# Patient Record
Sex: MALE | Race: BLACK OR AFRICAN AMERICAN | NOT HISPANIC OR LATINO | Employment: FULL TIME | URBAN - METROPOLITAN AREA
[De-identification: names, ages, dates, MRNs, and addresses within clinical notes are randomized per-mention and may not be internally consistent; named-entity substitution may affect disease eponyms.]

---

## 2017-02-23 ENCOUNTER — HISTORICAL (OUTPATIENT)
Dept: ADMINISTRATIVE | Facility: HOSPITAL | Age: 37
End: 2017-02-23

## 2019-05-14 ENCOUNTER — HISTORICAL (OUTPATIENT)
Dept: ADMINISTRATIVE | Facility: HOSPITAL | Age: 39
End: 2019-05-14

## 2019-05-24 ENCOUNTER — HISTORICAL (OUTPATIENT)
Dept: RADIOLOGY | Facility: HOSPITAL | Age: 39
End: 2019-05-24

## 2019-06-11 LAB
ABS NEUT (OLG): 2.83 X10(3)/MCL (ref 2.1–9.2)
BASOPHILS # BLD AUTO: 0.02 X10(3)/MCL (ref 0–0.2)
BASOPHILS NFR BLD AUTO: 0.4 % (ref 0–0.9)
EOSINOPHIL # BLD AUTO: 0.05 X10(3)/MCL (ref 0–0.9)
EOSINOPHIL NFR BLD AUTO: 1 % (ref 0–6.5)
ERYTHROCYTE [DISTWIDTH] IN BLOOD BY AUTOMATED COUNT: 13 % (ref 11.5–17)
HCT VFR BLD AUTO: 44.6 % (ref 42–52)
HGB BLD-MCNC: 14.2 GM/DL (ref 14–18)
IMM GRANULOCYTES # BLD AUTO: 0.01 10*3/UL (ref 0–0.02)
IMM GRANULOCYTES NFR BLD AUTO: 0.2 % (ref 0–0.43)
LYMPHOCYTES # BLD AUTO: 1.53 X10(3)/MCL (ref 0.6–4.6)
LYMPHOCYTES NFR BLD AUTO: 31.2 % (ref 16.2–38.3)
MCH RBC QN AUTO: 28 PG (ref 27–31)
MCHC RBC AUTO-ENTMCNC: 31.8 GM/DL (ref 33–36)
MCV RBC AUTO: 88 FL (ref 80–94)
MONOCYTES # BLD AUTO: 0.46 X10(3)/MCL (ref 0.1–1.3)
MONOCYTES NFR BLD AUTO: 9.4 % (ref 4.7–11.3)
NEUTROPHILS # BLD AUTO: 2.83 X10(3)/MCL (ref 2.1–9.2)
NEUTROPHILS NFR BLD AUTO: 57.8 % (ref 49.1–73.4)
NRBC BLD AUTO-RTO: 0 % (ref 0–0.2)
PLATELET # BLD AUTO: 223 X10(3)/MCL (ref 130–400)
PMV BLD AUTO: 9.9 FL (ref 7.4–10.4)
RBC # BLD AUTO: 5.07 X10(6)/MCL (ref 4.7–6.1)
WBC # SPEC AUTO: 4.9 X10(3)/MCL (ref 4.5–11.5)

## 2019-06-19 ENCOUNTER — HISTORICAL (OUTPATIENT)
Dept: SURGERY | Facility: HOSPITAL | Age: 39
End: 2019-06-19

## 2019-11-04 ENCOUNTER — HISTORICAL (OUTPATIENT)
Dept: ADMINISTRATIVE | Facility: HOSPITAL | Age: 39
End: 2019-11-04

## 2019-11-04 LAB
ABS NEUT (OLG): 1.91 X10(3)/MCL (ref 2.1–9.2)
ALBUMIN SERPL-MCNC: 4 GM/DL (ref 3.4–5)
ALBUMIN/GLOB SERPL: 1.2 {RATIO}
ALP SERPL-CCNC: 81 UNIT/L (ref 50–136)
ALT SERPL-CCNC: 25 UNIT/L (ref 12–78)
APPEARANCE, UA: CLEAR
AST SERPL-CCNC: 16 UNIT/L (ref 15–37)
BACTERIA SPEC CULT: ABNORMAL /HPF
BASOPHILS # BLD AUTO: 0 X10(3)/MCL (ref 0–0.2)
BASOPHILS NFR BLD AUTO: 1 %
BILIRUB SERPL-MCNC: 0.9 MG/DL (ref 0.2–1)
BILIRUB UR QL STRIP: NEGATIVE
BILIRUBIN DIRECT+TOT PNL SERPL-MCNC: 0.1 MG/DL (ref 0–0.2)
BILIRUBIN DIRECT+TOT PNL SERPL-MCNC: 0.8 MG/DL (ref 0–0.8)
BUN SERPL-MCNC: 15 MG/DL (ref 7–18)
CALCIUM SERPL-MCNC: 9.2 MG/DL (ref 8.5–10.1)
CHLORIDE SERPL-SCNC: 104 MMOL/L (ref 98–107)
CHOLEST SERPL-MCNC: 201 MG/DL (ref 0–200)
CHOLEST/HDLC SERPL: 2.9 {RATIO} (ref 0–5)
CO2 SERPL-SCNC: 29 MMOL/L (ref 21–32)
COLOR UR: YELLOW
CREAT SERPL-MCNC: 1.12 MG/DL (ref 0.7–1.3)
DEPRECATED CALCIDIOL+CALCIFEROL SERPL-MC: 15.42 NG/ML (ref 30–80)
EOSINOPHIL # BLD AUTO: 0.1 X10(3)/MCL (ref 0–0.9)
EOSINOPHIL NFR BLD AUTO: 2 %
ERYTHROCYTE [DISTWIDTH] IN BLOOD BY AUTOMATED COUNT: 12.9 % (ref 11.5–17)
EST. AVERAGE GLUCOSE BLD GHB EST-MCNC: 114 MG/DL
GLOBULIN SER-MCNC: 3.4 GM/DL (ref 2.4–3.5)
GLUCOSE (UA): NEGATIVE
GLUCOSE SERPL-MCNC: 93 MG/DL (ref 74–106)
HBA1C MFR BLD: 5.6 % (ref 4.2–6.3)
HCT VFR BLD AUTO: 44.7 % (ref 42–52)
HDLC SERPL-MCNC: 69 MG/DL (ref 35–60)
HGB BLD-MCNC: 14.1 GM/DL (ref 14–18)
HGB UR QL STRIP: NEGATIVE
KETONES UR QL STRIP: ABNORMAL
LDLC SERPL CALC-MCNC: 105 MG/DL (ref 0–129)
LEUKOCYTE ESTERASE UR QL STRIP: NEGATIVE
LYMPHOCYTES # BLD AUTO: 1.8 X10(3)/MCL (ref 0.6–4.6)
LYMPHOCYTES NFR BLD AUTO: 44 %
MCH RBC QN AUTO: 27.9 PG (ref 27–31)
MCHC RBC AUTO-ENTMCNC: 31.5 GM/DL (ref 33–36)
MCV RBC AUTO: 88.3 FL (ref 80–94)
MONOCYTES # BLD AUTO: 0.3 X10(3)/MCL (ref 0.1–1.3)
MONOCYTES NFR BLD AUTO: 8 %
NEUTROPHILS # BLD AUTO: 1.91 X10(3)/MCL (ref 2.1–9.2)
NEUTROPHILS NFR BLD AUTO: 46 %
NITRITE UR QL STRIP: NEGATIVE
PH UR STRIP: 5 [PH] (ref 5–9)
PLATELET # BLD AUTO: 222 X10(3)/MCL (ref 130–400)
PMV BLD AUTO: 10.8 FL (ref 9.4–12.4)
POTASSIUM SERPL-SCNC: 3.9 MMOL/L (ref 3.5–5.1)
PROT SERPL-MCNC: 7.4 GM/DL (ref 6.4–8.2)
PROT UR QL STRIP: NEGATIVE
RBC # BLD AUTO: 5.06 X10(6)/MCL (ref 4.7–6.1)
RBC #/AREA URNS HPF: ABNORMAL /[HPF]
SODIUM SERPL-SCNC: 138 MMOL/L (ref 136–145)
SP GR UR STRIP: 1.03 (ref 1–1.03)
SQUAMOUS EPITHELIAL, UA: ABNORMAL
TRIGL SERPL-MCNC: 133 MG/DL (ref 30–150)
TSH SERPL-ACNC: 1.14 MIU/L (ref 0.36–3.74)
UROBILINOGEN UR STRIP-ACNC: 0.2
VLDLC SERPL CALC-MCNC: 27 MG/DL
WBC # SPEC AUTO: 4.2 X10(3)/MCL (ref 4.5–11.5)
WBC #/AREA URNS HPF: ABNORMAL /HPF

## 2021-01-06 ENCOUNTER — HISTORICAL (OUTPATIENT)
Dept: ADMINISTRATIVE | Facility: HOSPITAL | Age: 41
End: 2021-01-06

## 2021-01-06 LAB
ABS NEUT (OLG): 2.2 X10(3)/MCL (ref 2.1–9.2)
ALBUMIN SERPL-MCNC: 4 GM/DL (ref 3.5–5)
ALBUMIN/GLOB SERPL: 1.1 RATIO (ref 1.1–2)
ALP SERPL-CCNC: 74 UNIT/L (ref 40–150)
ALT SERPL-CCNC: 25 UNIT/L (ref 0–55)
APPEARANCE, UA: CLEAR
AST SERPL-CCNC: 19 UNIT/L (ref 5–34)
BACTERIA SPEC CULT: ABNORMAL /HPF
BASOPHILS # BLD AUTO: 0 X10(3)/MCL (ref 0–0.2)
BASOPHILS NFR BLD AUTO: 0 %
BILIRUB SERPL-MCNC: 0.4 MG/DL
BILIRUB UR QL STRIP: NEGATIVE
BILIRUBIN DIRECT+TOT PNL SERPL-MCNC: 0.2 MG/DL (ref 0–0.5)
BILIRUBIN DIRECT+TOT PNL SERPL-MCNC: 0.2 MG/DL (ref 0–0.8)
BUN SERPL-MCNC: 21.4 MG/DL (ref 8.9–20.6)
CALCIUM SERPL-MCNC: 8.6 MG/DL (ref 8.4–10.2)
CHLORIDE SERPL-SCNC: 106 MMOL/L (ref 98–107)
CHOLEST SERPL-MCNC: 187 MG/DL
CHOLEST/HDLC SERPL: 3 {RATIO} (ref 0–5)
CO2 SERPL-SCNC: 26 MMOL/L (ref 22–29)
COLOR UR: YELLOW
CREAT SERPL-MCNC: 1.06 MG/DL (ref 0.73–1.18)
EOSINOPHIL # BLD AUTO: 0.1 X10(3)/MCL (ref 0–0.9)
EOSINOPHIL NFR BLD AUTO: 2 %
ERYTHROCYTE [DISTWIDTH] IN BLOOD BY AUTOMATED COUNT: 13.1 % (ref 11.5–17)
GLOBULIN SER-MCNC: 3.5 GM/DL (ref 2.4–3.5)
GLUCOSE (UA): NEGATIVE
GLUCOSE SERPL-MCNC: 83 MG/DL (ref 74–100)
HCT VFR BLD AUTO: 44.8 % (ref 42–52)
HDLC SERPL-MCNC: 58 MG/DL (ref 35–60)
HGB BLD-MCNC: 14 GM/DL (ref 14–18)
HGB UR QL STRIP: ABNORMAL
KETONES UR QL STRIP: NEGATIVE
LDLC SERPL CALC-MCNC: 95 MG/DL (ref 50–140)
LEUKOCYTE ESTERASE UR QL STRIP: NEGATIVE
LYMPHOCYTES # BLD AUTO: 1.6 X10(3)/MCL (ref 0.6–4.6)
LYMPHOCYTES NFR BLD AUTO: 38 %
MCH RBC QN AUTO: 27.9 PG (ref 27–31)
MCHC RBC AUTO-ENTMCNC: 31.3 GM/DL (ref 33–36)
MCV RBC AUTO: 89.4 FL (ref 80–94)
MONOCYTES # BLD AUTO: 0.4 X10(3)/MCL (ref 0.1–1.3)
MONOCYTES NFR BLD AUTO: 9 %
NEUTROPHILS # BLD AUTO: 2.2 X10(3)/MCL (ref 2.1–9.2)
NEUTROPHILS NFR BLD AUTO: 51 %
NITRITE UR QL STRIP: NEGATIVE
PH UR STRIP: 5.5 [PH] (ref 5–9)
PLATELET # BLD AUTO: 216 X10(3)/MCL (ref 130–400)
PMV BLD AUTO: 11.5 FL (ref 9.4–12.4)
POTASSIUM SERPL-SCNC: 4.2 MMOL/L (ref 3.5–5.1)
PROT SERPL-MCNC: 7.5 GM/DL (ref 6.4–8.3)
PROT UR QL STRIP: NEGATIVE
RBC # BLD AUTO: 5.01 X10(6)/MCL (ref 4.7–6.1)
RBC #/AREA URNS HPF: 1 /HPF (ref 0–2)
SODIUM SERPL-SCNC: 141 MMOL/L (ref 136–145)
SP GR UR STRIP: >=1.03 (ref 1–1.03)
SQUAMOUS EPITHELIAL, UA: ABNORMAL
TRIGL SERPL-MCNC: 170 MG/DL (ref 34–140)
UROBILINOGEN UR STRIP-ACNC: 0.2
VLDLC SERPL CALC-MCNC: 34 MG/DL
WBC # SPEC AUTO: 4.3 X10(3)/MCL (ref 4.5–11.5)
WBC #/AREA URNS HPF: ABNORMAL /[HPF]

## 2022-04-11 ENCOUNTER — HISTORICAL (OUTPATIENT)
Dept: ADMINISTRATIVE | Facility: HOSPITAL | Age: 42
End: 2022-04-11

## 2022-04-20 ENCOUNTER — HISTORICAL (OUTPATIENT)
Dept: ADMINISTRATIVE | Facility: HOSPITAL | Age: 42
End: 2022-04-20

## 2022-04-20 LAB
ABS NEUT (OLG): 2.07 (ref 2.1–9.2)
ALBUMIN SERPL-MCNC: 3.7 G/DL (ref 3.5–5)
ALBUMIN/GLOB SERPL: 1 {RATIO} (ref 1.1–2)
ALP SERPL-CCNC: 78 U/L (ref 40–150)
ALT SERPL-CCNC: 24 U/L (ref 0–55)
APPEARANCE, UA: CLEAR
AST SERPL-CCNC: 22 U/L (ref 5–34)
BACTERIA SPEC CULT: NORMAL
BASOPHILS # BLD AUTO: 0 10*3/UL (ref 0–0.2)
BASOPHILS NFR BLD AUTO: 1 %
BILIRUB SERPL-MCNC: 0.4 MG/DL
BILIRUB UR QL STRIP: NEGATIVE
BILIRUBIN DIRECT+TOT PNL SERPL-MCNC: 0.2 (ref 0–0.5)
BILIRUBIN DIRECT+TOT PNL SERPL-MCNC: 0.2 (ref 0–0.8)
BUDDING YEAST: NORMAL
BUN SERPL-MCNC: 18.3 MG/DL (ref 8.9–20.6)
CALCIUM SERPL-MCNC: 9.4 MG/DL (ref 8.7–10.5)
CASTS, UA: NORMAL
CHLORIDE SERPL-SCNC: 103 MMOL/L (ref 98–107)
CHOLEST SERPL-MCNC: 192 MG/DL
CHOLEST/HDLC SERPL: 4 {RATIO} (ref 0–5)
CO2 SERPL-SCNC: 29 MMOL/L (ref 22–29)
COLOR UR: YELLOW
CREAT SERPL-MCNC: 1.28 MG/DL (ref 0.73–1.18)
CREAT UR-MCNC: 184.3 MG/DL (ref 58–161)
CRYSTALS: NORMAL
DEPRECATED CALCIDIOL+CALCIFEROL SERPL-MC: 15.3 NG/ML (ref 30–80)
EOSINOPHIL # BLD AUTO: 0.1 10*3/UL (ref 0–0.9)
EOSINOPHIL NFR BLD AUTO: 2 %
ERYTHROCYTE [DISTWIDTH] IN BLOOD BY AUTOMATED COUNT: 13.2 % (ref 11.5–17)
EST. AVERAGE GLUCOSE BLD GHB EST-MCNC: 114 MG/DL
GLOBULIN SER-MCNC: 3.6 G/DL (ref 2.4–3.5)
GLUCOSE (UA): NEGATIVE
GLUCOSE SERPL-MCNC: 74 MG/DL (ref 74–100)
HBA1C MFR BLD: 5.6 %
HCT VFR BLD AUTO: 43.3 % (ref 42–52)
HDLC SERPL-MCNC: 50 MG/DL (ref 35–60)
HEMOLYSIS INTERF INDEX SERPL-ACNC: 5
HGB BLD-MCNC: 13.8 G/DL (ref 14–18)
HGB UR QL STRIP: NEGATIVE
ICTERIC INTERF INDEX SERPL-ACNC: 1
KETONES UR QL STRIP: NEGATIVE
LDLC SERPL CALC-MCNC: 114 MG/DL (ref 50–140)
LEUKOCYTE ESTERASE UR QL STRIP: NEGATIVE
LIPEMIC INTERF INDEX SERPL-ACNC: 19
LYMPHOCYTES # BLD AUTO: 1.9 10*3/UL (ref 0.6–4.6)
LYMPHOCYTES NFR BLD AUTO: 42 %
MANUAL DIFF? (OHS): NO
MCH RBC QN AUTO: 27.6 PG (ref 27–31)
MCHC RBC AUTO-ENTMCNC: 31.9 G/DL (ref 33–36)
MCV RBC AUTO: 86.6 FL (ref 80–94)
MICROALBUMIN UR-MCNC: <5
MICROALBUMIN/CREAT RATIO PNL UR: <2.7 (ref 0–30)
MONOCYTES # BLD AUTO: 0.4 10*3/UL (ref 0.1–1.3)
MONOCYTES NFR BLD AUTO: 10 %
NEUTROPHILS # BLD AUTO: 2.07 10*3/UL (ref 2.1–9.2)
NEUTROPHILS NFR BLD AUTO: 45 %
NITRITE UR QL STRIP: NEGATIVE
PH UR STRIP: 6.5 [PH] (ref 5–9)
PLATELET # BLD AUTO: 234 10*3/UL (ref 130–400)
PMV BLD AUTO: 11.5 FL (ref 9.4–12.4)
POTASSIUM SERPL-SCNC: 4 MMOL/L (ref 3.5–5.1)
PROT SERPL-MCNC: 7.3 G/DL (ref 6.4–8.3)
PROT UR QL STRIP: NEGATIVE
RBC # BLD AUTO: 5 10*6/UL (ref 4.7–6.1)
RBC #/AREA URNS HPF: NORMAL /[HPF] (ref 0–2)
SMALL ROUND CELLS, UA: NORMAL
SODIUM SERPL-SCNC: 138 MMOL/L (ref 136–145)
SP GR UR STRIP: 1.02 (ref 1–1.03)
SPERM URNS QL MICRO: NORMAL
SQUAMOUS EPITHELIAL, UA: NORMAL (ref 0–4)
TRIGL SERPL-MCNC: 140 MG/DL (ref 34–140)
UROBILINOGEN UR STRIP-ACNC: 0.2
VLDLC SERPL CALC-MCNC: 28 MG/DL
WBC # SPEC AUTO: 4.6 10*3/UL (ref 4.5–11.5)
WBC #/AREA URNS HPF: NORMAL /[HPF] (ref 0–2)

## 2022-04-27 VITALS
HEIGHT: 74 IN | SYSTOLIC BLOOD PRESSURE: 110 MMHG | WEIGHT: 221 LBS | BODY MASS INDEX: 28.36 KG/M2 | DIASTOLIC BLOOD PRESSURE: 84 MMHG | OXYGEN SATURATION: 99 %

## 2022-04-29 NOTE — OP NOTE
DATE OF SURGERY:    06/19/2019    SURGEON:  Frederick Lara MD    PREOPERATIVE DIAGNOSIS:  Left knee lateral meniscus tear.    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    PROCEDURE:    1. Left knee scope, lateral meniscectomy, lateral side chondroplasty.  2. Patellofemoral chondroplasty.    INDICATIONS:  The patient is a 39-year-old male who is well known to me.  He has a history of left knee pain after sustaining an injury.  MRI was performed recently, noted to have a large lateral meniscus tear, as well as some chondromalacia to his lateral side.  I discussed all treatment options with the patient.  Risks, benefits and alternatives to left knee scoped were discussed in detail with the patient.  All questions were answered.  No guarantees were made.  Despite these risks, he elected to proceed with surgical intervention.    PROCEDURE IN DETAIL:  The patient was seen in preoperative holding area, was marked and consented for surgery.  Taken to the operative suite and placed under general endotracheal anesthesia.  Left leg prepped and draped in normal sterile fashion.  Time-out was performed verifying correct patient, site, side and procedure.  All were in agreement.  Left leg was exsanguinated with an Esmarch tourniquet.  Tourniquet was inflated to 300 mmHg.  Total tourniquet time was roughly 20 minutes.  A knife was used to make an incision on the lateral side of his patella for a left parapatellar portal.  The scope was inserted into the suprapatellar pouch.  Patella was evaluated.  We did find grade 2/some 3 chondromalacia to the patella apex.  No issue with the trochlea was noted.  I swung around to the medial gutter.  Medial gutter was evaluated, found to be clean of any loose bodies or any issues, popped into the medial joint line.  His medial meniscus was intact.  There was a slight ripple although it was very small and was stable.  There was no  chondromalacia.  No chondral damage noted to the medial side including the femur and the tibia.  Medial portal was localized with an 18-gauge spinal needle.  It was then incised with a knife and dilated with the trocar.  We then inserted our nerve hook, swung around to evaluate his ACL.  ACL was intact with no issues.  Upon entering the lateral compartment, we noted a very large posterolateral meniscus tear that was degenerative in nature, had some radial pieces, radial tears.  There were some free loose flaps as well.  This went from about the 2 o'clock position all the way around to the 5 o'clock position.  We also noted some grade 3 and some areas of early grade 4 chondromalacia mostly to his tibia.  No significant issues were noted to his femur.  We initially debrided the meniscus back to a stable border using a combination of a straight biter as well as a shaver.  Once we got the meniscus back to stable border, we debrided the majority of the meniscus, specifically in the posterolateral section.  We then performed a chondroplasty of the tibia, taking this back to a stable rim of cartilage.  After completion of his lateral meniscectomy, we then swung to the lateral gutter.  No issues noted here.  We went back to the suprapatellar pouch.  Using the shaver, we did perform a patellofemoral chondroplasty removing this fibrillation along his patella back to a stable rim.  Afterwards, the knee was drained of all excess fluid.  We dropped the tourniquet and coagulated all bleeders.  We then closed the knee with 3-0 nylon in interrupted fashion.  We then dressed the knee with sterile dressings.  The patient will be discharged home today with appropriate pain medication as well as an aspirin for DVT prophylaxis.  He will be seen back with me in 2 weeks.        ______________________________  MD ESMER Shaw/AROLDO  DD:  06/19/2019  Time:  08:17AM  DT:  06/19/2019  Time:  08:42AM  Job #:  [id number]

## 2022-05-31 RX ORDER — DEXTROAMPHETAMINE SACCHARATE, AMPHETAMINE ASPARTATE, DEXTROAMPHETAMINE SULFATE AND AMPHETAMINE SULFATE 7.5; 7.5; 7.5; 7.5 MG/1; MG/1; MG/1; MG/1
30 TABLET ORAL DAILY
Qty: 30 TABLET | Refills: 0 | Status: SHIPPED | OUTPATIENT
Start: 2022-05-31 | End: 2022-06-16 | Stop reason: SDUPTHER

## 2022-05-31 RX ORDER — DEXTROAMPHETAMINE SACCHARATE, AMPHETAMINE ASPARTATE, DEXTROAMPHETAMINE SULFATE AND AMPHETAMINE SULFATE 7.5; 7.5; 7.5; 7.5 MG/1; MG/1; MG/1; MG/1
30 TABLET ORAL
COMMUNITY
Start: 2021-12-15 | End: 2022-05-31 | Stop reason: SDUPTHER

## 2022-05-31 NOTE — TELEPHONE ENCOUNTER
Sent to Dr. lanier to approve  ----- Message from Yohana Gil sent at 5/31/2022  2:10 PM CDT -----  Need refills on Adderall  [facility] [address]

## 2022-06-16 RX ORDER — DEXTROAMPHETAMINE SACCHARATE, AMPHETAMINE ASPARTATE, DEXTROAMPHETAMINE SULFATE AND AMPHETAMINE SULFATE 7.5; 7.5; 7.5; 7.5 MG/1; MG/1; MG/1; MG/1
30 TABLET ORAL DAILY
Qty: 30 TABLET | Refills: 0 | Status: SHIPPED | OUTPATIENT
Start: 2022-06-16 | End: 2022-06-20 | Stop reason: SDUPTHER

## 2022-06-16 NOTE — TELEPHONE ENCOUNTER
----- Message from Delaney Banuelos sent at 6/16/2022  3:40 PM CDT -----  Regarding: med  Pt is req refill of Adderall - [facility] [address]  [phone]

## 2022-06-16 NOTE — TELEPHONE ENCOUNTER
Will send to Dr. Solano at this time  ----- Message from Delaney Banuelos sent at 6/16/2022  3:40 PM CDT -----  Regarding: med  Pt is req refill of Adderall - [facility] [address]  [phone]

## 2022-06-21 RX ORDER — DEXTROAMPHETAMINE SACCHARATE, AMPHETAMINE ASPARTATE, DEXTROAMPHETAMINE SULFATE AND AMPHETAMINE SULFATE 7.5; 7.5; 7.5; 7.5 MG/1; MG/1; MG/1; MG/1
30 TABLET ORAL 2 TIMES DAILY
Qty: 30 TABLET | Refills: 0 | Status: SHIPPED | OUTPATIENT
Start: 2022-06-21 | End: 2022-07-05 | Stop reason: SDUPTHER

## 2022-07-05 RX ORDER — DEXTROAMPHETAMINE SACCHARATE, AMPHETAMINE ASPARTATE, DEXTROAMPHETAMINE SULFATE AND AMPHETAMINE SULFATE 7.5; 7.5; 7.5; 7.5 MG/1; MG/1; MG/1; MG/1
30 TABLET ORAL 2 TIMES DAILY
Qty: 60 TABLET | Refills: 0 | Status: SHIPPED | OUTPATIENT
Start: 2022-07-05 | End: 2022-08-04 | Stop reason: SDUPTHER

## 2022-07-05 NOTE — TELEPHONE ENCOUNTER
This was sent on 6/21/22 but for only 15 days, will prashant  ----- Message from Yohana Gil sent at 7/5/2022  3:22 PM CDT -----  Need refills on adderal  [facility] [address]

## 2022-08-04 RX ORDER — DEXTROAMPHETAMINE SACCHARATE, AMPHETAMINE ASPARTATE, DEXTROAMPHETAMINE SULFATE AND AMPHETAMINE SULFATE 7.5; 7.5; 7.5; 7.5 MG/1; MG/1; MG/1; MG/1
30 TABLET ORAL 2 TIMES DAILY
Qty: 60 TABLET | Refills: 0 | Status: SHIPPED | OUTPATIENT
Start: 2022-08-04 | End: 2022-08-05 | Stop reason: SDUPTHER

## 2022-08-04 NOTE — TELEPHONE ENCOUNTER
----- Message from Delaney Banuelos sent at 8/4/2022  3:12 PM CDT -----  Regarding: med  Pt is req Refill of adderall 30 mg x2 daily  [facility] [address]  [phone]

## 2022-08-05 RX ORDER — DEXTROAMPHETAMINE SACCHARATE, AMPHETAMINE ASPARTATE, DEXTROAMPHETAMINE SULFATE AND AMPHETAMINE SULFATE 7.5; 7.5; 7.5; 7.5 MG/1; MG/1; MG/1; MG/1
30 TABLET ORAL 2 TIMES DAILY
Qty: 60 TABLET | Refills: 0 | Status: SHIPPED | OUTPATIENT
Start: 2022-08-05 | End: 2022-09-06 | Stop reason: SDUPTHER

## 2022-09-06 RX ORDER — DEXTROAMPHETAMINE SACCHARATE, AMPHETAMINE ASPARTATE, DEXTROAMPHETAMINE SULFATE AND AMPHETAMINE SULFATE 7.5; 7.5; 7.5; 7.5 MG/1; MG/1; MG/1; MG/1
30 TABLET ORAL 2 TIMES DAILY
Qty: 60 TABLET | Refills: 0 | Status: SHIPPED | OUTPATIENT
Start: 2022-09-06 | End: 2022-10-12 | Stop reason: SDUPTHER

## 2022-09-06 NOTE — TELEPHONE ENCOUNTER
Will send  ----- Message from Mirta Inman Patient Care Assistant sent at 9/6/2022  3:52 PM CDT -----  Regarding: med refill  Type:  RX Refill Request    RX Name and Strength: dextroamphetamine-amphetamine (ADDERALL) 30 mg Tab    How is the patient currently taking it? (ex. 1XDay): Take 1 tablet (30 mg total) by mouth 2 (two) times a day    Is this a 30 day or 90 day RX: 60 tablets    Preferred Pharmacy with phone number: [facility]/pharmacy #[id number]  [place] - [address] AT [address] [facility] Call Back Number: [phone]    Additional Information: pt req refill on meds please

## 2022-10-12 RX ORDER — DEXTROAMPHETAMINE SACCHARATE, AMPHETAMINE ASPARTATE, DEXTROAMPHETAMINE SULFATE AND AMPHETAMINE SULFATE 7.5; 7.5; 7.5; 7.5 MG/1; MG/1; MG/1; MG/1
30 TABLET ORAL 2 TIMES DAILY
Qty: 60 TABLET | Refills: 0 | Status: SHIPPED | OUTPATIENT
Start: 2022-10-12 | End: 2022-11-14 | Stop reason: SDUPTHER

## 2022-10-12 NOTE — TELEPHONE ENCOUNTER
----- Message from Delaney Banuelos sent at 10/12/2022  3:01 PM CDT -----  Regarding: med  Pt is req Refill adderall - [facility] [address]  [phone]

## 2022-11-14 DIAGNOSIS — F41.9 ANXIETY: Primary | ICD-10-CM

## 2022-11-14 RX ORDER — DEXTROAMPHETAMINE SACCHARATE, AMPHETAMINE ASPARTATE, DEXTROAMPHETAMINE SULFATE AND AMPHETAMINE SULFATE 7.5; 7.5; 7.5; 7.5 MG/1; MG/1; MG/1; MG/1
30 TABLET ORAL 2 TIMES DAILY
Qty: 60 TABLET | Refills: 0 | Status: SHIPPED | OUTPATIENT
Start: 2022-11-14 | End: 2022-11-15 | Stop reason: SDUPTHER

## 2022-11-14 NOTE — TELEPHONE ENCOUNTER
----- Message from Yohana Gil sent at 11/14/2022  3:10 PM CST -----  Needs refills on Adderall  [facility] [address]

## 2022-11-15 RX ORDER — DEXTROAMPHETAMINE SACCHARATE, AMPHETAMINE ASPARTATE, DEXTROAMPHETAMINE SULFATE AND AMPHETAMINE SULFATE 7.5; 7.5; 7.5; 7.5 MG/1; MG/1; MG/1; MG/1
30 TABLET ORAL 2 TIMES DAILY
Qty: 60 TABLET | Refills: 0 | Status: SHIPPED | OUTPATIENT
Start: 2022-11-15 | End: 2022-12-14 | Stop reason: SDUPTHER

## 2022-12-14 DIAGNOSIS — F41.9 ANXIETY: ICD-10-CM

## 2022-12-14 RX ORDER — DEXTROAMPHETAMINE SACCHARATE, AMPHETAMINE ASPARTATE, DEXTROAMPHETAMINE SULFATE AND AMPHETAMINE SULFATE 7.5; 7.5; 7.5; 7.5 MG/1; MG/1; MG/1; MG/1
30 TABLET ORAL 2 TIMES DAILY
Qty: 14 TABLET | Refills: 0 | Status: SHIPPED | OUTPATIENT
Start: 2022-12-14 | End: 2022-12-21 | Stop reason: SDUPTHER

## 2022-12-14 NOTE — TELEPHONE ENCOUNTER
----- Message from Mirta Inman Patient Care Assistant sent at 12/14/2022  3:51 PM CST -----  Regarding: med refill  Type:  RX Refill Request    RX Name and Strength: dextroamphetamine-amphetamine (ADDERALL) 30 mg Tab    How is the patient currently taking it? (ex. 1XDay): Take 1 tablet (30 mg total) by mouth 2 (two) times a day    Is this a 30 day or 90 day RX: 60 tablets    Preferred Pharmacy with phone number: [facility]/PHARMACY #[id number] - [place] - [address] Call Back Number: [phone]    Additional Information: pt needs refill on med please

## 2022-12-21 DIAGNOSIS — F41.9 ANXIETY: ICD-10-CM

## 2022-12-22 NOTE — TELEPHONE ENCOUNTER
----- Message from Yohana Gil sent at 12/22/2022 10:55 AM CST -----  Spoke to Evan made him aware, appt also set for 01/26/23, thanks  ----- Message -----  From: Molly Servin MA  Sent: 12/22/2022  10:41 AM CST  To: Yohana Gil    Ill have Dr. Hong refill for the rest we can put him for a month.     ----- Message -----  From: Yohana Gil  Sent: 12/22/2022  10:38 AM CST  To: Molly Servin MA    I called Evan, he only has 2 pills left of Adderall. Is it possible to make an appt for him tomorrow morning   ----- Message -----  From: Molly Servin MA  Sent: 12/21/2022   4:52 PM CST  To: Yohana Gil    Patient needs appt for a follow up to refill more  ----- Message -----  From: Yohana Gil  Sent: 12/21/2022   3:47 PM CST  To: Molly Servin MA    Calling for the remaining script for adderall  [facility] [address] [facility] [address]

## 2022-12-23 RX ORDER — DEXTROAMPHETAMINE SACCHARATE, AMPHETAMINE ASPARTATE, DEXTROAMPHETAMINE SULFATE AND AMPHETAMINE SULFATE 7.5; 7.5; 7.5; 7.5 MG/1; MG/1; MG/1; MG/1
30 TABLET ORAL 2 TIMES DAILY
Qty: 46 TABLET | Refills: 0 | Status: SHIPPED | OUTPATIENT
Start: 2022-12-23 | End: 2023-01-19 | Stop reason: SDUPTHER

## 2023-01-19 ENCOUNTER — OFFICE VISIT (OUTPATIENT)
Dept: INTERNAL MEDICINE | Facility: CLINIC | Age: 43
End: 2023-01-19
Payer: COMMERCIAL

## 2023-01-19 VITALS
WEIGHT: 222 LBS | SYSTOLIC BLOOD PRESSURE: 120 MMHG | OXYGEN SATURATION: 97 % | HEART RATE: 73 BPM | BODY MASS INDEX: 28.49 KG/M2 | DIASTOLIC BLOOD PRESSURE: 80 MMHG | HEIGHT: 74 IN

## 2023-01-19 DIAGNOSIS — F90.9 ATTENTION DEFICIT HYPERACTIVITY DISORDER (ADHD), UNSPECIFIED ADHD TYPE: Primary | ICD-10-CM

## 2023-01-19 PROBLEM — R25.1 TREMOR: Status: ACTIVE | Noted: 2023-01-19

## 2023-01-19 PROBLEM — F90.0 ATTENTION DEFICIT HYPERACTIVITY DISORDER (ADHD), PREDOMINANTLY INATTENTIVE TYPE: Status: ACTIVE | Noted: 2023-01-19

## 2023-01-19 PROCEDURE — 99214 PR OFFICE/OUTPT VISIT, EST, LEVL IV, 30-39 MIN: ICD-10-PCS | Mod: ,,, | Performed by: INTERNAL MEDICINE

## 2023-01-19 PROCEDURE — 99214 OFFICE O/P EST MOD 30 MIN: CPT | Mod: ,,, | Performed by: INTERNAL MEDICINE

## 2023-01-19 RX ORDER — DEXTROAMPHETAMINE SACCHARATE, AMPHETAMINE ASPARTATE, DEXTROAMPHETAMINE SULFATE AND AMPHETAMINE SULFATE 7.5; 7.5; 7.5; 7.5 MG/1; MG/1; MG/1; MG/1
30 TABLET ORAL 2 TIMES DAILY
Qty: 46 TABLET | Refills: 0 | Status: SHIPPED | OUTPATIENT
Start: 2023-01-19 | End: 2023-02-14 | Stop reason: SDUPTHER

## 2023-01-19 NOTE — PROGRESS NOTES
Subjective:      Patient ID: Rashid Severino Jr. is a 42 y.o. male.    Chief Complaint: Follow-up (ADHD f/u)      HPI=This patient is an established patient. The active problem list and current medication listed in the chart will be reviewed at the time of this visit. This patient's medical illnesses have been well recognized and documented in the chart. A review of systems will be taken at the time of this visit, and any new information necessary for care will be documented.  This patient is a 42-year-old established patient who has a long history of an attention deficit disorder.  He has been on medication for at least 10 years and he has done very well.  By the history that he gives the quality of his life improved as he begin to take the medication he had excellent readings at his work.  He was able to get a long in into reactions with other people.  He will improved peer into reactions he has been monitored in his blood pressure, and he has never had any blood pressure problems and he has never had any cardiac irregularity.  He has been able to coordinate his work schedule with his family life, and he has not had any trouble.      The requirement is that this patient comes to the office at least every 4 months, and since I will only be here in practice over the next 60 days, I have asked him to start looking into getting a new physician going forward who may continue to give him his medication as he currently takes it.       The patient's Health Maintenance was reviewed and the following appears to be due at this time:   Health Maintenance Due   Topic Date Due    Hepatitis C Screening  Never done    HIV Screening  Never done    TETANUS VACCINE  06/08/2009    COVID-19 Vaccine (3 - Booster for Pfizer series) 10/15/2021    Influenza Vaccine (1) Never done        Recent Labwork  No visits with results within 1 Month(s) from this visit.   Latest known visit with results is:   Historical on 04/20/2022    Component Date Value Ref Range Status    Hemoglobin A1c 04/20/2022 5.6  <=7.0 Final    Estimated Average Glucose 04/20/2022 114.0   Final    WBC 04/20/2022 4.6  4.5 - 11.5 Final    RBC 04/20/2022 5.00  4.70 - 6.10 Final    Hgb 04/20/2022 13.8  14.0 - 18.0 Final    Hct 04/20/2022 43.3  42.0 - 52.0 Final    MCV 04/20/2022 86.6  80.0 - 94.0 Final    MCH 04/20/2022 27.6  27.0 - 31.0 Final    MCHC 04/20/2022 31.9  33.0 - 36.0 Final    RDW 04/20/2022 13.2  11.5 - 17.0 Final    Platelet 04/20/2022 234  130 - 400 Final    MPV 04/20/2022 11.5  9.4 - 12.4 Final    Abs Neut 04/20/2022 2.07  2.10 - 9.20 Final    Manual Diff? 04/20/2022 No   Final    Neut % 04/20/2022 45   Final    Lymph % 04/20/2022 42   Final    Mono % 04/20/2022 10   Final    Eos % 04/20/2022 2   Final    Basophil % 04/20/2022 1   Final    Lymph # 04/20/2022 1.9  0.6 - 4.6 Final    Neut # 04/20/2022 2.07  2.10 - 9.20 Final    Mono # 04/20/2022 0.4  0.1 - 1.3 Final    Eos # 04/20/2022 0.1  0.0 - 0.9 Final    Baso # 04/20/2022 0.0  0.0 - 0.2 Final    Color, UA 04/20/2022 Yellow  Yellow Final    Appearance, UA 04/20/2022 Clear  Clear Final    Specific Gravity,UA 04/20/2022 1.025  1.000 - 1.032 Final    pH, UA 04/20/2022 6.5  5.0 - 9.0 Final    Protein, UA 04/20/2022 Negative  Negative Final    Glucose, UA 04/20/2022 Negative  Negative Final    Ketones, UA 04/20/2022 Negative  Negative Final    Bilirubin (UA) 04/20/2022 Negative  Negative Final    Occult Blood UA 04/20/2022 Negative  Negative Final    Urobilinogen, UA 04/20/2022 0.2   Final    Nitrite, UA 04/20/2022 Negative  Negative Final    Leukocytes, UA 04/20/2022 Negative  Negative Final    WBC, UA 04/20/2022 NONE SEEN  0 - 2 Final    RBC, UA 04/20/2022 NONE SEEN  0 - 2 Final    Squam Epithel, UA 04/20/2022 NONE SEEN  0 - 4 Final    Bacteria 04/20/2022 NONE SEEN  None Seen Final    Yeast, UA 04/20/2022 NOT PRESENT   Final    Sperm, UA 04/20/2022 NOT PRESENT    Final    Crystals 04/20/2022 NOT PRESENT   Final    Small Round Cells, UA 04/20/2022 NOT PRESENT   Final    CASTS, UA 04/20/2022 NONE SEEN   Final    Cholesterol Total 04/20/2022 192  <=200 Final    HDL Cholesterol 04/20/2022 50  35 - 60 Final    Triglyceride 04/20/2022 140  34 - 140 Final    Very Low Density Lipoprotein 04/20/2022 28   Final    Cholesterol/HDL Ratio 04/20/2022 4  0 - 5 Final    LDL Cholesterol 04/20/2022 114.00  50.00 - 140.00 Final    Urine Microalbumin 04/20/2022 <5.0  <=30.0 Final    Urine Creatinine 04/20/2022 184.3  58.0 - 161.0 Final    Microalbumin Creatinine Ratio 04/20/2022 <2.7  0.0 - 30.0 Final    Sodium Level 04/20/2022 138  136 - 145 Final    Potassium Level 04/20/2022 4.0  3.5 - 5.1 Final    Chloride 04/20/2022 103  98 - 107 Final    Carbon Dioxide 04/20/2022 29  22 - 29 Final    Glucose Level 04/20/2022 74  74 - 100 Final    Blood Urea Nitrogen 04/20/2022 18.3  8.9 - 20.6 Final    Creatinine 04/20/2022 1.28  0.73 - 1.18 Final    Calcium Level Total 04/20/2022 9.4  8.7 - 10.5 Final    Albumin Level 04/20/2022 3.7  3.5 - 5.0 Final    Protein Total 04/20/2022 7.3  6.4 - 8.3 Final    Globulin 04/20/2022 3.6  2.4 - 3.5 Final    Albumin/Globulin Ratio 04/20/2022 1.0  1.1 - 2.0 Final    Alkaline Phosphatase 04/20/2022 78  40 - 150 Final    Bilirubin Total 04/20/2022 0.4  <=1.5 Final    Bilirubin Direct 04/20/2022 0.2  0.0 - 0.5 Final    Bilirubin Indirect 04/20/2022 0.20  0.00 - 0.80 Final    Aspartate Aminotransferase 04/20/2022 22  5 - 34 Final    Alanine Aminotransferase 04/20/2022 24  0 - 55 Final    Hemolysis 04/20/2022 5   Final    Icterus 04/20/2022 1   Final    Lipemia 04/20/2022 19   Final    Estimated GFR- 04/20/2022 >60   Final    Estimated GFR-Non  04/20/2022 >60   Final    Vit D 25 OH 04/20/2022 15.3  30.0 - 80.0 Final       Past Medical History:  Past Medical History:   Diagnosis Date    ADHD (attention  "deficit hyperactivity disorder)      Past Surgical History:   Procedure Laterality Date    ARTHROSCOPY OF KNEE      MOUTH SURGERY       Review of patient's allergies indicates:  No Known Allergies  Current Outpatient Medications on File Prior to Visit   Medication Sig Dispense Refill    dextroamphetamine-amphetamine (ADDERALL) 30 mg Tab Take 1 tablet (30 mg total) by mouth 2 (two) times a day. 46 tablet 0     No current facility-administered medications on file prior to visit.     Social History     Socioeconomic History    Marital status: Single   Tobacco Use    Smoking status: Never    Smokeless tobacco: Never   Substance and Sexual Activity    Alcohol use: Yes    Drug use: Never    Sexual activity: Yes     Family History   Problem Relation Age of Onset    Diabetes type II Mother     Hypertension Father     Arthritis Father        Review of Systems  Review of Systems   Constitutional: Negative.    HENT: Negative.    Eyes: Negative.    Respiratory: Negative.    Cardiovascular: Negative.    Gastrointestinal: Negative.    Genitourinary: Negative.    Musculoskeletal: Negative.    Neurological: Negative.    Endo/Heme/Allergies: Negative.    Psychiatric/Behavioral: Negative.    Objective:   /80   Pulse 73   Ht 6' 2" (1.88 m)   Wt 100.7 kg (222 lb)   SpO2 97%   BMI 28.50 kg/m²         Physical Exam  Vitals and nursing note reviewed.   Constitutional:       General: He is not in acute distress.     Appearance: Normal appearance.   HENT:      Head: Normocephalic and atraumatic.      Right Ear: Tympanic membrane and ear canal normal.      Left Ear: Tympanic membrane and ear canal normal.      Nose: Nose normal.      Mouth/Throat:      Pharynx: Oropharynx is clear. No oropharyngeal exudate or posterior oropharyngeal erythema.   Eyes:      Extraocular Movements: Extraocular movements intact.      Pupils: Pupils are equal, round, and reactive to light.   Cardiovascular:      Rate and Rhythm: Normal rate " and regular rhythm.      Pulses: Normal pulses.      Heart sounds: Normal heart sounds. No murmur heard.    No friction rub. No gallop.   Pulmonary:      Effort: Pulmonary effort is normal. No respiratory distress.      Breath sounds: Normal breath sounds.   Abdominal:      General: Abdomen is flat. Bowel sounds are normal.      Palpations: Abdomen is soft.   Genitourinary:     Rectum: Normal.   Musculoskeletal:         General: Normal range of motion.      Cervical back: Normal range of motion and neck supple.   Skin:     General: Skin is warm.      Capillary Refill: Capillary refill takes less than 2 seconds.   Neurological:      General: No focal deficit present.      Mental Status: He is alert and oriented to person, place, and time.   Psychiatric:         Mood and Affect: Mood normal.         Behavior: Behavior normal.         Thought Content: Thought content normal.         Judgment: Judgment normal.   Assessment:     1. Attention deficit hyperactivity disorder (ADHD), unspecified ADHD type      Plan:   I am having Rashid Severino Jr. maintain his dextroamphetamine-amphetamine.This patient is an established patient who has come in for an examination. He has done well since his last visit to the office. He has a negative review of systems, except as recorded above. He has not had any new problems to develop in the recent past. I was able to review his medication with him on the way patient monitoring will be done in the future, at least every 4 months.  He has not had any side effects from the medication that he takes and by taking medication, he has noticed an improvement in the quality of his life in general, in in his work performance.  Is able to interact well with people, and he has not had any negative event to occur while on this medication.  He is kind to say that he will miss me going forward, but I leave the challenge to him to continue his medication as he does, and to find a physician who will  continue the continuity in his general care.  A refill for the next 3 months will be provided.    30 minutes of total time spent on the encounter, which includes face to face time and non-face to face time preparing to see the patient, obtaining and/or reviewing separately obtained history, documenting clinical information in the electronic or other health record, independently interpreting results and communicating results to the patient/family/caregiver, or care coordination    Problem List Items Addressed This Visit    None  Visit Diagnoses       Attention deficit hyperactivity disorder (ADHD), unspecified ADHD type    -  Primary            Edward was seen today for follow-up.    Diagnoses and all orders for this visit:    Attention deficit hyperactivity disorder (ADHD), unspecified ADHD type  The following orders have not been finalized:  -     dextroamphetamine-amphetamine (ADDERALL) 30 mg Tab

## 2023-02-14 DIAGNOSIS — F90.9 ATTENTION DEFICIT HYPERACTIVITY DISORDER (ADHD), UNSPECIFIED ADHD TYPE: ICD-10-CM

## 2023-02-14 RX ORDER — DEXTROAMPHETAMINE SACCHARATE, AMPHETAMINE ASPARTATE, DEXTROAMPHETAMINE SULFATE AND AMPHETAMINE SULFATE 7.5; 7.5; 7.5; 7.5 MG/1; MG/1; MG/1; MG/1
30 TABLET ORAL 2 TIMES DAILY
Qty: 60 TABLET | Refills: 0 | Status: SHIPPED | OUTPATIENT
Start: 2023-02-14 | End: 2023-02-15 | Stop reason: SDUPTHER

## 2023-02-15 DIAGNOSIS — F90.9 ATTENTION DEFICIT HYPERACTIVITY DISORDER (ADHD), UNSPECIFIED ADHD TYPE: ICD-10-CM

## 2023-02-16 RX ORDER — DEXTROAMPHETAMINE SACCHARATE, AMPHETAMINE ASPARTATE, DEXTROAMPHETAMINE SULFATE AND AMPHETAMINE SULFATE 7.5; 7.5; 7.5; 7.5 MG/1; MG/1; MG/1; MG/1
30 TABLET ORAL 2 TIMES DAILY
Qty: 60 TABLET | Refills: 0 | Status: SHIPPED | OUTPATIENT
Start: 2023-02-16 | End: 2023-05-16 | Stop reason: SDUPTHER

## 2023-03-09 NOTE — TELEPHONE ENCOUNTER
----- Message from Yohana Gil sent at 12/21/2022  3:46 PM CST -----  Calling for the remaining script for adderall  [facility] [address]     Rotation Flap Text: The defect edges were debeveled with a #15 scalpel blade.  Given the location of the defect, shape of the defect and the proximity to free margins a rotation flap was deemed most appropriate.  Using a sterile surgical marker, an appropriate rotation flap was drawn incorporating the defect and placing the expected incisions within the relaxed skin tension lines where possible.    The area thus outlined was incised deep to adipose tissue with a #15 scalpel blade.  The skin margins were undermined to an appropriate distance in all directions utilizing iris scissors.

## 2023-05-16 ENCOUNTER — OFFICE VISIT (OUTPATIENT)
Dept: INTERNAL MEDICINE | Facility: CLINIC | Age: 43
End: 2023-05-16
Payer: COMMERCIAL

## 2023-05-16 VITALS
HEART RATE: 82 BPM | HEIGHT: 74 IN | OXYGEN SATURATION: 96 % | DIASTOLIC BLOOD PRESSURE: 72 MMHG | WEIGHT: 238.38 LBS | TEMPERATURE: 98 F | BODY MASS INDEX: 30.59 KG/M2 | SYSTOLIC BLOOD PRESSURE: 110 MMHG

## 2023-05-16 DIAGNOSIS — F90.9 ATTENTION DEFICIT HYPERACTIVITY DISORDER (ADHD), UNSPECIFIED ADHD TYPE: ICD-10-CM

## 2023-05-16 PROCEDURE — 99213 OFFICE O/P EST LOW 20 MIN: CPT | Mod: ,,,

## 2023-05-16 PROCEDURE — 99213 PR OFFICE/OUTPT VISIT, EST, LEVL III, 20-29 MIN: ICD-10-PCS | Mod: ,,,

## 2023-05-16 RX ORDER — DEXTROAMPHETAMINE SACCHARATE, AMPHETAMINE ASPARTATE, DEXTROAMPHETAMINE SULFATE AND AMPHETAMINE SULFATE 7.5; 7.5; 7.5; 7.5 MG/1; MG/1; MG/1; MG/1
30 TABLET ORAL 2 TIMES DAILY
Qty: 60 TABLET | Refills: 0 | Status: SHIPPED | OUTPATIENT
Start: 2023-07-13 | End: 2023-06-23

## 2023-05-16 RX ORDER — DEXTROAMPHETAMINE SACCHARATE, AMPHETAMINE ASPARTATE, DEXTROAMPHETAMINE SULFATE AND AMPHETAMINE SULFATE 7.5; 7.5; 7.5; 7.5 MG/1; MG/1; MG/1; MG/1
30 TABLET ORAL 2 TIMES DAILY
Qty: 60 TABLET | Refills: 0 | Status: SHIPPED | OUTPATIENT
Start: 2023-06-14 | End: 2023-06-23

## 2023-05-16 RX ORDER — DEXTROAMPHETAMINE SACCHARATE, AMPHETAMINE ASPARTATE, DEXTROAMPHETAMINE SULFATE AND AMPHETAMINE SULFATE 7.5; 7.5; 7.5; 7.5 MG/1; MG/1; MG/1; MG/1
30 TABLET ORAL 2 TIMES DAILY
Qty: 60 TABLET | Refills: 0 | Status: SHIPPED | OUTPATIENT
Start: 2023-05-16 | End: 2023-06-15

## 2023-05-16 NOTE — ASSESSMENT & PLAN NOTE
-denies prior psych eval for high-dose, however reports has been on for several years  -currently on adderrall 30mg BID, continue and refill until he establishes with new PCP  -notify office for new or worrisome symptoms

## 2023-05-16 NOTE — PROGRESS NOTES
Patient ID: Rashid Severino Jr. is a 43 y.o. male.    Chief Complaint: Medication Refill (Refill Adderall)    43-year-old male here today for medication refill visit as a Dr. Hong patient.  Patient does have an upcoming visit for establishment of care with new PCP in August, since prior PCP recently retired.  Since last visit patient reports has been fairly well without acute injury or major illness.  Today requesting refill on Adderall prescription.  Noted currently on Adderall 30 mg b.i.d..  Denies prior psych eval for high dose Adderall.  States prior PCP has written for several years.  Denies headaches, weight loss, palpitations.  Reports symptoms well-controlled on current dosage.  Otherwise feeling well without any acute concerns.      MEDICAL HISTORY:    Past Medical History:   Diagnosis Date    ADHD (attention deficit hyperactivity disorder)       Past Surgical History:   Procedure Laterality Date    ARTHROSCOPY OF KNEE      MOUTH SURGERY        Social History     Tobacco Use    Smoking status: Never    Smokeless tobacco: Never   Substance Use Topics    Alcohol use: Yes    Drug use: Never          Health Maintenance Due   Topic Date Due    Hepatitis C Screening  Never done    HIV Screening  Never done    TETANUS VACCINE  06/08/2009    COVID-19 Vaccine (3 - Booster for Pfizer series) 10/15/2021          Patient Care Team:  Nahum Hong Jr., MD as PCP - General (Internal Medicine)      Review of Systems   Constitutional:  Negative for fatigue and fever.   HENT:  Negative for congestion, rhinorrhea, sore throat and trouble swallowing.    Eyes:  Negative for redness and visual disturbance.   Respiratory:  Negative for cough, chest tightness and shortness of breath.    Cardiovascular:  Negative for chest pain and palpitations.   Gastrointestinal:  Negative for abdominal pain, constipation, diarrhea, nausea and vomiting.   Genitourinary:  Negative for dysuria, flank pain, frequency and urgency.  "  Musculoskeletal:  Negative for arthralgias, gait problem and myalgias.   Skin:  Negative for rash and wound.   Neurological:  Negative for facial asymmetry, speech difficulty, weakness and headaches.   All other systems reviewed and are negative.    Objective:   /72 (BP Location: Left arm, Patient Position: Sitting, BP Method: Large (Manual))   Pulse 82   Temp 97.9 °F (36.6 °C)   Ht 6' 2.02" (1.88 m)   Wt 108.1 kg (238 lb 6.4 oz)   SpO2 96%   BMI 30.60 kg/m²      Physical Exam  Constitutional:       General: He is not in acute distress.     Appearance: Normal appearance.   HENT:      Right Ear: Tympanic membrane, ear canal and external ear normal.      Left Ear: Tympanic membrane, ear canal and external ear normal.      Nose: Nose normal.      Mouth/Throat:      Mouth: Mucous membranes are moist.      Pharynx: Oropharynx is clear.   Eyes:      Extraocular Movements: Extraocular movements intact.      Conjunctiva/sclera: Conjunctivae normal.      Pupils: Pupils are equal, round, and reactive to light.   Cardiovascular:      Rate and Rhythm: Normal rate and regular rhythm.      Pulses: Normal pulses.      Heart sounds: Normal heart sounds. No murmur heard.    No gallop.   Pulmonary:      Effort: Pulmonary effort is normal.      Breath sounds: Normal breath sounds. No wheezing.   Abdominal:      General: Bowel sounds are normal. There is no distension.      Palpations: Abdomen is soft. There is no mass.      Tenderness: There is no abdominal tenderness. There is no guarding.   Musculoskeletal:         General: Normal range of motion.   Skin:     General: Skin is warm and dry.   Neurological:      Mental Status: He is alert. Mental status is at baseline.      Sensory: No sensory deficit.      Motor: No weakness.         Assessment:       ICD-10-CM ICD-9-CM   1. Attention deficit hyperactivity disorder (ADHD), unspecified ADHD type  F90.9 314.01        Plan:     Problem List Items Addressed This Visit       "    Psychiatric    Attention deficit hyperactivity disorder (ADHD)     -denies prior psych eval for high-dose, however reports has been on for several years  -currently on adderrall 30mg BID, continue and refill until he establishes with new PCP  -notify office for new or worrisome symptoms           Relevant Medications    dextroamphetamine-amphetamine (ADDERALL) 30 mg Tab (Start on 7/13/2023)    dextroamphetamine-amphetamine 30 mg Tab (Start on 6/14/2023)    dextroamphetamine-amphetamine 30 mg Tab          Follow up for Previously scheduled and PRN if need.   -plan specifics discussed above    Orders Placed This Encounter    dextroamphetamine-amphetamine (ADDERALL) 30 mg Tab    dextroamphetamine-amphetamine 30 mg Tab    dextroamphetamine-amphetamine 30 mg Tab        Medication List with Changes/Refills   New Medications    DEXTROAMPHETAMINE-AMPHETAMINE 30 MG TAB    Take 1 tablet (30 mg total) by mouth 2 (two) times a day.    DEXTROAMPHETAMINE-AMPHETAMINE 30 MG TAB    Take 1 tablet (30 mg total) by mouth 2 (two) times a day.   Changed and/or Refilled Medications    Modified Medication Previous Medication    DEXTROAMPHETAMINE-AMPHETAMINE (ADDERALL) 30 MG TAB dextroamphetamine-amphetamine (ADDERALL) 30 mg Tab       Take 1 tablet (30 mg total) by mouth 2 (two) times a day.    Take 1 tablet (30 mg total) by mouth 2 (two) times a day.

## 2023-06-23 DIAGNOSIS — F90.9 ATTENTION DEFICIT HYPERACTIVITY DISORDER (ADHD), UNSPECIFIED ADHD TYPE: ICD-10-CM

## 2023-06-23 RX ORDER — DEXTROAMPHETAMINE SACCHARATE, AMPHETAMINE ASPARTATE, DEXTROAMPHETAMINE SULFATE AND AMPHETAMINE SULFATE 7.5; 7.5; 7.5; 7.5 MG/1; MG/1; MG/1; MG/1
30 TABLET ORAL 2 TIMES DAILY
Qty: 60 TABLET | Refills: 0 | Status: SHIPPED | OUTPATIENT
Start: 2023-07-13 | End: 2023-08-22

## 2023-08-22 ENCOUNTER — OFFICE VISIT (OUTPATIENT)
Dept: FAMILY MEDICINE | Facility: CLINIC | Age: 43
End: 2023-08-22
Payer: COMMERCIAL

## 2023-08-22 VITALS
OXYGEN SATURATION: 98 % | DIASTOLIC BLOOD PRESSURE: 74 MMHG | SYSTOLIC BLOOD PRESSURE: 110 MMHG | BODY MASS INDEX: 31.51 KG/M2 | HEART RATE: 82 BPM | TEMPERATURE: 98 F | RESPIRATION RATE: 16 BRPM | HEIGHT: 74 IN | WEIGHT: 245.5 LBS

## 2023-08-22 DIAGNOSIS — R25.1 TREMOR: ICD-10-CM

## 2023-08-22 DIAGNOSIS — F90.9 ATTENTION DEFICIT HYPERACTIVITY DISORDER (ADHD), UNSPECIFIED ADHD TYPE: ICD-10-CM

## 2023-08-22 DIAGNOSIS — Z00.00 WELLNESS EXAMINATION: Primary | ICD-10-CM

## 2023-08-22 DIAGNOSIS — Z13.220 ENCOUNTER FOR LIPID SCREENING FOR CARDIOVASCULAR DISEASE: ICD-10-CM

## 2023-08-22 DIAGNOSIS — R73.01 ELEVATED FASTING GLUCOSE: ICD-10-CM

## 2023-08-22 DIAGNOSIS — Z11.59 ENCOUNTER FOR HEPATITIS C SCREENING TEST FOR LOW RISK PATIENT: ICD-10-CM

## 2023-08-22 DIAGNOSIS — Z13.6 ENCOUNTER FOR LIPID SCREENING FOR CARDIOVASCULAR DISEASE: ICD-10-CM

## 2023-08-22 PROCEDURE — 99396 PREV VISIT EST AGE 40-64: CPT | Mod: ,,, | Performed by: INTERNAL MEDICINE

## 2023-08-22 PROCEDURE — 99396 PR PREVENTIVE VISIT,EST,40-64: ICD-10-PCS | Mod: ,,, | Performed by: INTERNAL MEDICINE

## 2023-08-22 RX ORDER — DEXTROAMPHETAMINE SACCHARATE, AMPHETAMINE ASPARTATE MONOHYDRATE, DEXTROAMPHETAMINE SULFATE AND AMPHETAMINE SULFATE 5; 5; 5; 5 MG/1; MG/1; MG/1; MG/1
20 CAPSULE, EXTENDED RELEASE ORAL EVERY MORNING
Qty: 30 CAPSULE | Refills: 0 | Status: SHIPPED | OUTPATIENT
Start: 2023-08-22 | End: 2023-09-25 | Stop reason: SDUPTHER

## 2023-08-22 NOTE — PROGRESS NOTES
Subjective:      Patient ID: Rashid Severino Jr. is a 43 y.o. male.    Chief Complaint: Establish Care    HPI    New Patient   Establish Care  Previous PCP Dr. Hong  Last OV seen by RANDELL Tiffany May 2023  Patient employed by [employer]; supervisor position now, unmarried, has one son age 8 yo    Chronic Medical Condition:    ADHD  -current Rx Adderall 30 mg po BID  -PMDP reviewed, last refill 05/2023  -patient says he could not fill again because out of stock    Tremor:  -chronic problem, affecting both face and hands, worse with stress  -has not seen neurology- not ready for this     OA knees:  S/p meniscus repair to L knee  Not using daily OTC medication but more pain associated with this  Has not seen Dr. Lara again since then    Surgery history:  Knee surgery  Mouth surgery    Allergies: NKDA      Health Maintenance Due   Topic Date Due    Hepatitis C Screening  Never done    HIV Screening  Never done    TETANUS VACCINE  06/08/2009    COVID-19 Vaccine (3 - Pfizer series) 10/15/2021    Hemoglobin A1c (Prediabetes)  04/20/2023     Review of Systems   Constitutional:  Negative for chills, fatigue and fever.   HENT:  Negative for congestion, ear pain, postnasal drip, rhinorrhea, sinus pressure and sore throat.    Eyes:  Negative for itching and visual disturbance.   Respiratory:  Negative for cough, shortness of breath and wheezing.    Cardiovascular:  Negative for chest pain, palpitations and leg swelling.   Gastrointestinal:  Negative for abdominal pain and nausea.   Genitourinary:  Negative for dysuria.   Musculoskeletal:  Positive for arthralgias. Negative for myalgias.   Skin:  Negative for rash.   Neurological:  Positive for tremors. Negative for weakness, light-headedness and headaches.       Objective:     Vitals:    08/22/23 0918   BP: 110/74   BP Location: Left arm   Patient Position: Sitting   BP Method: Large (Automatic)   Pulse: 82   Resp: 16   Temp: 98.3 °F (36.8 °C)   TempSrc:  "Temporal   SpO2: 98%   Weight: 111.4 kg (245 lb 8 oz)   Height: 6' 2" (1.88 m)     Physical Exam  Vitals and nursing note reviewed.   Constitutional:       General: He is not in acute distress.     Appearance: He is well-developed. He is not diaphoretic.   HENT:      Head: Normocephalic and atraumatic.   Eyes:      General:         Right eye: No discharge.         Left eye: No discharge.      Conjunctiva/sclera: Conjunctivae normal.      Pupils: Pupils are equal, round, and reactive to light.   Neck:      Thyroid: No thyromegaly.   Cardiovascular:      Rate and Rhythm: Normal rate and regular rhythm.      Heart sounds: Normal heart sounds. No murmur heard.  Pulmonary:      Effort: Pulmonary effort is normal. No respiratory distress.      Breath sounds: Normal breath sounds. No wheezing or rales.   Abdominal:      General: There is no distension.      Palpations: Abdomen is soft.      Tenderness: There is no abdominal tenderness.   Musculoskeletal:      Cervical back: Normal range of motion and neck supple.   Lymphadenopathy:      Cervical: No cervical adenopathy.   Skin:     General: Skin is warm and dry.   Neurological:      Mental Status: He is alert and oriented to person, place, and time.   Psychiatric:         Mood and Affect: Mood normal.         Behavior: Behavior normal.       Assessment/Plan:     1. Wellness examination  -     Comprehensive Metabolic Panel; Future; Expected date: 08/22/2023  -     Lipid Panel; Future; Expected date: 08/22/2023  -     CBC Auto Differential; Future; Expected date: 08/22/2023  -     Hemoglobin A1C; Future; Expected date: 08/22/2023  -     Urinalysis; Future; Expected date: 08/22/2023  -     Hepatitis C Antibody; Future; Expected date: 08/22/2023  Fasting labs ordered   2. Attention deficit hyperactivity disorder (ADHD), unspecified ADHD type  -     Comprehensive Metabolic Panel; Future; Expected date: 08/22/2023  -     Lipid Panel; Future; Expected date: 08/22/2023  -     CBC " Auto Differential; Future; Expected date: 08/22/2023  -     Hemoglobin A1C; Future; Expected date: 08/22/2023  -     Urinalysis; Future; Expected date: 08/22/2023  -     Hepatitis C Antibody; Future; Expected date: 08/22/2023  -     Drug Screen, Urine; Future; Expected date: 08/22/2023  PMDP reviewed  Adderall 20 mg po XR sent in now  If not available, we can try 30 mg po BID dose  If not available then patient to find out if can afford vyvanse  3. Encounter for lipid screening for cardiovascular disease  -     Comprehensive Metabolic Panel; Future; Expected date: 08/22/2023  -     Lipid Panel; Future; Expected date: 08/22/2023  -     CBC Auto Differential; Future; Expected date: 08/22/2023  -     Hemoglobin A1C; Future; Expected date: 08/22/2023  -     Urinalysis; Future; Expected date: 08/22/2023  -     Hepatitis C Antibody; Future; Expected date: 08/22/2023    4. Encounter for hepatitis C screening test for low risk patient  -     Comprehensive Metabolic Panel; Future; Expected date: 08/22/2023  -     Lipid Panel; Future; Expected date: 08/22/2023  -     CBC Auto Differential; Future; Expected date: 08/22/2023  -     Hemoglobin A1C; Future; Expected date: 08/22/2023  -     Urinalysis; Future; Expected date: 08/22/2023  -     Hepatitis C Antibody; Future; Expected date: 08/22/2023    5. Elevated fasting glucose  -     Comprehensive Metabolic Panel; Future; Expected date: 08/22/2023  -     Lipid Panel; Future; Expected date: 08/22/2023  -     CBC Auto Differential; Future; Expected date: 08/22/2023  -     Hemoglobin A1C; Future; Expected date: 08/22/2023  -     Urinalysis; Future; Expected date: 08/22/2023  -     Hepatitis C Antibody; Future; Expected date: 08/22/2023    6. Tremor  Unsure of etiology  Strange characteristics involving the face  I do advise patient to seek neurology evaluation  Declines at this time however he will think about it and let me know if interested      Other orders  -      dextroamphetamine-amphetamine (ADDERALL XR) 20 MG 24 hr capsule; Take 1 capsule (20 mg total) by mouth every morning.  Dispense: 30 capsule; Refill: 0       Follow up in about 3 months (around 11/22/2023) for Follow Up - Controlled Med.    This includes face to face time and non-face to face time preparing to see the patient (eg, review of tests), obtaining and/or reviewing separately obtained history, documenting clinical information in the electronic or other health record, independently interpreting results and communicating results to the patient/family/caregiver, or care coordinator.

## 2023-09-25 RX ORDER — DEXTROAMPHETAMINE SACCHARATE, AMPHETAMINE ASPARTATE MONOHYDRATE, DEXTROAMPHETAMINE SULFATE AND AMPHETAMINE SULFATE 5; 5; 5; 5 MG/1; MG/1; MG/1; MG/1
20 CAPSULE, EXTENDED RELEASE ORAL EVERY MORNING
Qty: 30 CAPSULE | Refills: 0 | Status: SHIPPED | OUTPATIENT
Start: 2023-09-25 | End: 2023-10-26 | Stop reason: SDUPTHER

## 2023-09-25 NOTE — TELEPHONE ENCOUNTER
----- Message from Marisela SenPeaceHealthgeraldine sent at 9/25/2023  8:52 AM CDT -----  Regarding: refill  .Type:  RX Refill Request    Who Called:  pt    Refill or New Rx: dextroamphetamine-amphetamine (ADDERALL XR) 20 MG 24 hr capsule    RX Name and Strength: 20 mg    How is the patient currently taking it? (ex. 1XDay): one day    Is this a 30 day or 90 day RX: 30    Preferred Pharmacy with phone number: [phone] [facility]    Local or Mail Order: local    Ordering Provider: Tony    Would the patient rather a call back? Yes if needed     Best Call Back Number: [phone]    Additional Information:  refill

## 2023-09-25 NOTE — TELEPHONE ENCOUNTER
I have signed for the following orders AND/OR meds. Please notify the patient and ask the patient to schedule the testing and/or information about any medications that were sent.      Medications Ordered This Encounter   Medications    dextroamphetamine-amphetamine (ADDERALL XR) 20 MG 24 hr capsule     Sig: Take 1 capsule (20 mg total) by mouth every morning.     Dispense:  30 capsule     Refill:  0     No orders of the defined types were placed in this encounter.

## 2023-10-26 ENCOUNTER — TELEPHONE (OUTPATIENT)
Dept: FAMILY MEDICINE | Facility: CLINIC | Age: 43
End: 2023-10-26
Payer: COMMERCIAL

## 2023-10-26 DIAGNOSIS — F90.9 ATTENTION DEFICIT HYPERACTIVITY DISORDER (ADHD), UNSPECIFIED ADHD TYPE: Primary | ICD-10-CM

## 2023-10-26 RX ORDER — DEXTROAMPHETAMINE SACCHARATE, AMPHETAMINE ASPARTATE MONOHYDRATE, DEXTROAMPHETAMINE SULFATE AND AMPHETAMINE SULFATE 5; 5; 5; 5 MG/1; MG/1; MG/1; MG/1
20 CAPSULE, EXTENDED RELEASE ORAL EVERY MORNING
Qty: 30 CAPSULE | Refills: 0 | Status: SHIPPED | OUTPATIENT
Start: 2023-10-26 | End: 2023-12-06 | Stop reason: SDUPTHER

## 2023-10-26 NOTE — TELEPHONE ENCOUNTER
Covering for dr. Orozco while out  [facility] 8/22/23. Next appt 11/22/23  [facility] reviewed.   Last fill 9/25/23 for adderall xr 20mg daily.   Refill approved. Keep scheduled appt with dr. Orozco for further refills.     I have signed for the following orders AND/OR meds.  Please call the patient and ask the patient to schedule the testing AND/OR inform about any medications that were sent.      Medications Ordered This Encounter   Medications    dextroamphetamine-amphetamine (ADDERALL XR) 20 MG 24 hr capsule     Sig: Take 1 capsule (20 mg total) by mouth every morning.     Dispense:  30 capsule     Refill:  0

## 2023-10-26 NOTE — TELEPHONE ENCOUNTER
----- Message from Shoaib Kelechi sent at 10/26/2023  4:12 PM CDT -----  .Type:  RX Refill Request    Who Called: pt  Refill or New Rx:Refill  RX Name and Strength:dextroamphetamine-amphetamine (ADDERALL XR) 20 MG 24 hr capsule  How is the patient currently taking it? (ex. 1XDay):1xday  Is this a 30 day or 90 day RX:30 day  Preferred Pharmacy with phone number:[id number]  [address]  [address]  Local or Mail Order:local  Ordering Provider:  Would the patient rather a call back or a response via [facility]? Call back  Best Call Back Number:  Additional Information:

## 2023-10-27 NOTE — TELEPHONE ENCOUNTER
Patient notified rx sent to pharmacy and to keep appointment with Dr. Orozco. He voiced understanding. Lynette

## 2023-12-06 ENCOUNTER — OFFICE VISIT (OUTPATIENT)
Dept: FAMILY MEDICINE | Facility: CLINIC | Age: 43
End: 2023-12-06
Payer: COMMERCIAL

## 2023-12-06 DIAGNOSIS — F90.9 ATTENTION DEFICIT HYPERACTIVITY DISORDER (ADHD), UNSPECIFIED ADHD TYPE: ICD-10-CM

## 2023-12-06 DIAGNOSIS — F90.2 ATTENTION DEFICIT HYPERACTIVITY DISORDER (ADHD), COMBINED TYPE: Primary | ICD-10-CM

## 2023-12-06 PROCEDURE — 99213 PR OFFICE/OUTPT VISIT, EST, LEVL III, 20-29 MIN: ICD-10-PCS | Mod: 95,,, | Performed by: NURSE PRACTITIONER

## 2023-12-06 PROCEDURE — 99213 OFFICE O/P EST LOW 20 MIN: CPT | Mod: 95,,, | Performed by: NURSE PRACTITIONER

## 2023-12-06 RX ORDER — DEXTROAMPHETAMINE SACCHARATE, AMPHETAMINE ASPARTATE MONOHYDRATE, DEXTROAMPHETAMINE SULFATE AND AMPHETAMINE SULFATE 5; 5; 5; 5 MG/1; MG/1; MG/1; MG/1
20 CAPSULE, EXTENDED RELEASE ORAL EVERY MORNING
Qty: 30 CAPSULE | Refills: 0 | Status: SHIPPED | OUTPATIENT
Start: 2024-02-06

## 2023-12-06 RX ORDER — DEXTROAMPHETAMINE SACCHARATE, AMPHETAMINE ASPARTATE MONOHYDRATE, DEXTROAMPHETAMINE SULFATE AND AMPHETAMINE SULFATE 5; 5; 5; 5 MG/1; MG/1; MG/1; MG/1
20 CAPSULE, EXTENDED RELEASE ORAL EVERY MORNING
Qty: 30 CAPSULE | Refills: 0 | Status: SHIPPED | OUTPATIENT
Start: 2024-01-06

## 2023-12-06 RX ORDER — DEXTROAMPHETAMINE SACCHARATE, AMPHETAMINE ASPARTATE MONOHYDRATE, DEXTROAMPHETAMINE SULFATE AND AMPHETAMINE SULFATE 5; 5; 5; 5 MG/1; MG/1; MG/1; MG/1
20 CAPSULE, EXTENDED RELEASE ORAL EVERY MORNING
Qty: 30 CAPSULE | Refills: 0 | Status: SHIPPED | OUTPATIENT
Start: 2023-12-06

## 2023-12-06 NOTE — ASSESSMENT & PLAN NOTE
Stable  Continue Adderall as prescribed;  reviewed; Rx sent  Instructed to continue to monitor symptoms  Report to ED for any CP, SOB, and/or worsening symptoms  Keep appt with PCP for follow up  Pt is agreeable to plan and verbalizes understanding

## 2023-12-06 NOTE — PROGRESS NOTES
Subjective:      Patient ID: Rashid Severino Jr. is a 43 y.o. Black or  male      Chief Complaint: Follow-up    This is a telemedicine note. Patient was treated using telemedicine, real-time audio and video. I, distant provider, conducted the visit from location identified below. The patient participated in the visit at a non- [facility] location selected by the patient identified below. I am licensed in the state where the patient stated they are located. The patient stated that they understood and accepted the privacy and security risks to their information at their location.  Patient was located at home.  I, distant provider, was located at [facility]      Past Medical History:   Diagnosis Date    ADHD (attention deficit hyperactivity disorder)         HPI  Presents to clinic for follow up ADHD.    ADHD  -Current Meds: Adderall 30 mg po BID with improved symptoms  -PMDP reviewed, last refill 10/26/2023  Meds working well. Needs refills.            Patient Care Team:  Tony Orozco MD as PCP - General (Internal Medicine)      Review of Systems   Constitutional:  Negative for chills, fatigue, fever and unexpected weight change.   HENT: Negative.     Eyes: Negative.    Respiratory: Negative.  Negative for shortness of breath.    Cardiovascular: Negative.  Negative for chest pain.   Gastrointestinal: Negative.    Endocrine: Negative.    Genitourinary: Negative.    Musculoskeletal: Negative.    Integumentary:  Negative.   Allergic/Immunologic: Negative.    Neurological: Negative.  Negative for weakness.   Hematological: Negative.    Psychiatric/Behavioral: Negative.     All other systems reviewed and are negative.          Objective:      There were no vitals filed for this visit.   There is no height or weight on file to calculate BMI.     Physical Exam  Constitutional:       Appearance: Normal appearance.   Pulmonary:      Effort: No respiratory distress.   Neurological:      Mental  Status: He is alert and oriented to person, place, and time.            Current Outpatient Medications:     [START ON 1/6/2024] dextroamphetamine-amphetamine (ADDERALL XR) 20 MG 24 hr capsule, Take 1 capsule (20 mg total) by mouth every morning., Disp: 30 capsule, Rfl: 0    dextroamphetamine-amphetamine (ADDERALL XR) 20 MG 24 hr capsule, Take 1 capsule (20 mg total) by mouth every morning., Disp: 30 capsule, Rfl: 0    [START ON 2/6/2024] dextroamphetamine-amphetamine (ADDERALL XR) 20 MG 24 hr capsule, Take 1 capsule (20 mg total) by mouth every morning., Disp: 30 capsule, Rfl: 0    Assessment & Plan:     Problem List Items Addressed This Visit          Psychiatric    Attention deficit hyperactivity disorder (ADHD) - Primary     Stable  Continue Adderall as prescribed;  reviewed; Rx sent  Instructed to continue to monitor symptoms  Report to ED for any CP, SOB, and/or worsening symptoms  Keep appt with PCP for follow up  Pt is agreeable to plan and verbalizes understanding           Relevant Medications    dextroamphetamine-amphetamine (ADDERALL XR) 20 MG 24 hr capsule (Start on 1/6/2024)    dextroamphetamine-amphetamine (ADDERALL XR) 20 MG 24 hr capsule    dextroamphetamine-amphetamine (ADDERALL XR) 20 MG 24 hr capsule (Start on 2/6/2024)            Total time; 15 minute

## 2024-04-04 DIAGNOSIS — F90.9 ATTENTION DEFICIT HYPERACTIVITY DISORDER (ADHD), UNSPECIFIED ADHD TYPE: ICD-10-CM

## 2024-04-04 RX ORDER — DEXTROAMPHETAMINE SACCHARATE, AMPHETAMINE ASPARTATE MONOHYDRATE, DEXTROAMPHETAMINE SULFATE AND AMPHETAMINE SULFATE 5; 5; 5; 5 MG/1; MG/1; MG/1; MG/1
20 CAPSULE, EXTENDED RELEASE ORAL EVERY MORNING
Qty: 7 CAPSULE | Refills: 0 | Status: SHIPPED | OUTPATIENT
Start: 2024-04-04 | End: 2024-04-25 | Stop reason: SDUPTHER

## 2024-04-25 ENCOUNTER — OFFICE VISIT (OUTPATIENT)
Dept: FAMILY MEDICINE | Facility: CLINIC | Age: 44
End: 2024-04-25
Payer: COMMERCIAL

## 2024-04-25 DIAGNOSIS — F90.9 ATTENTION DEFICIT HYPERACTIVITY DISORDER (ADHD), UNSPECIFIED ADHD TYPE: ICD-10-CM

## 2024-04-25 DIAGNOSIS — M25.512 ACUTE PAIN OF LEFT SHOULDER: Primary | ICD-10-CM

## 2024-04-25 PROCEDURE — 99214 OFFICE O/P EST MOD 30 MIN: CPT | Mod: 95,,, | Performed by: NURSE PRACTITIONER

## 2024-04-25 RX ORDER — DICLOFENAC SODIUM 75 MG/1
75 TABLET, DELAYED RELEASE ORAL 2 TIMES DAILY
Qty: 60 TABLET | Refills: 0 | Status: SHIPPED | OUTPATIENT
Start: 2024-04-25 | End: 2024-05-29

## 2024-04-25 RX ORDER — DEXTROAMPHETAMINE SACCHARATE, AMPHETAMINE ASPARTATE MONOHYDRATE, DEXTROAMPHETAMINE SULFATE AND AMPHETAMINE SULFATE 5; 5; 5; 5 MG/1; MG/1; MG/1; MG/1
20 CAPSULE, EXTENDED RELEASE ORAL EVERY MORNING
Qty: 30 CAPSULE | Refills: 0 | Status: SHIPPED | OUTPATIENT
Start: 2024-06-25

## 2024-04-25 RX ORDER — DEXTROAMPHETAMINE SACCHARATE, AMPHETAMINE ASPARTATE MONOHYDRATE, DEXTROAMPHETAMINE SULFATE AND AMPHETAMINE SULFATE 5; 5; 5; 5 MG/1; MG/1; MG/1; MG/1
20 CAPSULE, EXTENDED RELEASE ORAL EVERY MORNING
Qty: 30 CAPSULE | Refills: 0 | Status: SHIPPED | OUTPATIENT
Start: 2024-05-25

## 2024-04-25 RX ORDER — DEXTROAMPHETAMINE SACCHARATE, AMPHETAMINE ASPARTATE MONOHYDRATE, DEXTROAMPHETAMINE SULFATE AND AMPHETAMINE SULFATE 5; 5; 5; 5 MG/1; MG/1; MG/1; MG/1
20 CAPSULE, EXTENDED RELEASE ORAL EVERY MORNING
Qty: 30 CAPSULE | Refills: 0 | Status: SHIPPED | OUTPATIENT
Start: 2024-04-25

## 2024-04-25 NOTE — ASSESSMENT & PLAN NOTE
Chronic; worsening the past few months  Rx NSAID; No other NSAID  Refer to Ortho for evaluation  Verbalizes understanding

## 2024-04-25 NOTE — PROGRESS NOTES
Subjective:      Patient ID: Rashid Severino Jr. is a 44 y.o. Black or  male      Chief Complaint: Left Shoulder Pain     This is a telemedicine note. Patient was treated using telemedicine, real-time audio and video. I, distant provider, conducted the visit from location identified below. The patient participated in the visit at a non- [facility] location selected by the patient identified below. I am licensed in the state where the patient stated they are located. The patient stated that they understood and accepted the privacy and security risks to their information at their location.  Patient was located at work  I, distant provider, was located at [facility]      Past Medical History:   Diagnosis Date    ADHD (attention deficit hyperactivity disorder)         HPI  Shoulder Pain   The pain is present in the left shoulder. This is a chronic (Worsening in the past 3 months) problem. The pain is at a severity of 8/10. Associated symptoms include a limited range of motion. Pertinent negatives include no fever, headaches, joint swelling, numbness or tingling. He has tried nothing for the symptoms.     ADHD  -Current Meds: Adderall XR 20 mg po daily with improved symptoms  -Meds working well. Needs refills.         Patient Care Team:  Tony Orozco MD as PCP - General (Internal Medicine)      Review of Systems   Constitutional:  Negative for activity change, chills, fatigue, fever and unexpected weight change.   HENT: Negative.  Negative for hearing loss and trouble swallowing.    Eyes: Negative.  Negative for discharge.   Respiratory: Negative.  Negative for chest tightness, shortness of breath and wheezing.    Cardiovascular: Negative.  Negative for chest pain and palpitations.   Gastrointestinal: Negative.  Negative for blood in stool, constipation, diarrhea and vomiting.   Endocrine: Negative.  Negative for polydipsia and polyuria.   Genitourinary: Negative.  Negative for  difficulty urinating, hematuria and urgency.   Musculoskeletal: Negative.  Negative for arthralgias and joint swelling.        Left shoulder pain   Integumentary:  Negative.   Allergic/Immunologic: Negative.    Neurological: Negative.  Negative for tingling, weakness, numbness and headaches.   Hematological: Negative.    Psychiatric/Behavioral: Negative.  Negative for confusion and dysphoric mood.    All other systems reviewed and are negative.          Objective:      There were no vitals filed for this visit.   There is no height or weight on file to calculate BMI.     Physical Exam       Current Outpatient Medications:     [START ON 6/25/2024] dextroamphetamine-amphetamine (ADDERALL XR) 20 MG 24 hr capsule, Take 1 capsule (20 mg total) by mouth every morning., Disp: 30 capsule, Rfl: 0    [START ON 5/25/2024] dextroamphetamine-amphetamine (ADDERALL XR) 20 MG 24 hr capsule, Take 1 capsule (20 mg total) by mouth every morning., Disp: 30 capsule, Rfl: 0    dextroamphetamine-amphetamine (ADDERALL XR) 20 MG 24 hr capsule, Take 1 capsule (20 mg total) by mouth every morning., Disp: 30 capsule, Rfl: 0    diclofenac (VOLTAREN) 75 MG EC tablet, Take 1 tablet (75 mg total) by mouth 2 (two) times daily., Disp: 60 tablet, Rfl: 0    Assessment & Plan:     Problem List Items Addressed This Visit          Psychiatric    Attention deficit hyperactivity disorder (ADHD)     Stable  Continue Adderall as prescribed;  reviewed; Rx sent  Instructed to continue to monitor symptoms  Report to ED for any CP, SOB, and/or worsening symptoms  Keep appt with PCP for follow up  Pt is agreeable to plan and verbalizes understanding         Relevant Medications    dextroamphetamine-amphetamine (ADDERALL XR) 20 MG 24 hr capsule (Start on 6/25/2024)    dextroamphetamine-amphetamine (ADDERALL XR) 20 MG 24 hr capsule (Start on 5/25/2024)    dextroamphetamine-amphetamine (ADDERALL XR) 20 MG 24 hr capsule       Orthopedic    Acute pain of left  shoulder - Primary     Chronic; worsening the past few months  Rx NSAID; No other NSAID  Refer to Ortho for evaluation  Verbalizes understanding         Relevant Medications    diclofenac (VOLTAREN) 75 MG EC tablet    Other Relevant Orders    Ambulatory referral/consult to Orthopedics            Total time:  14 minutes

## 2024-05-29 DIAGNOSIS — M25.512 ACUTE PAIN OF LEFT SHOULDER: ICD-10-CM

## 2024-05-29 RX ORDER — DICLOFENAC SODIUM 75 MG/1
75 TABLET, DELAYED RELEASE ORAL 2 TIMES DAILY
Qty: 60 TABLET | Refills: 0 | Status: SHIPPED | OUTPATIENT
Start: 2024-05-29

## 2024-07-26 ENCOUNTER — OFFICE VISIT (OUTPATIENT)
Dept: FAMILY MEDICINE | Facility: CLINIC | Age: 44
End: 2024-07-26
Payer: COMMERCIAL

## 2024-07-26 ENCOUNTER — TELEPHONE (OUTPATIENT)
Dept: FAMILY MEDICINE | Facility: CLINIC | Age: 44
End: 2024-07-26

## 2024-07-26 DIAGNOSIS — F90.9 ATTENTION DEFICIT HYPERACTIVITY DISORDER (ADHD), UNSPECIFIED ADHD TYPE: Primary | ICD-10-CM

## 2024-07-26 RX ORDER — DEXTROAMPHETAMINE SACCHARATE, AMPHETAMINE ASPARTATE MONOHYDRATE, DEXTROAMPHETAMINE SULFATE AND AMPHETAMINE SULFATE 5; 5; 5; 5 MG/1; MG/1; MG/1; MG/1
20 CAPSULE, EXTENDED RELEASE ORAL EVERY MORNING
Qty: 30 CAPSULE | Refills: 0 | Status: SHIPPED | OUTPATIENT
Start: 2024-07-26

## 2024-07-26 RX ORDER — DEXTROAMPHETAMINE SACCHARATE, AMPHETAMINE ASPARTATE MONOHYDRATE, DEXTROAMPHETAMINE SULFATE AND AMPHETAMINE SULFATE 5; 5; 5; 5 MG/1; MG/1; MG/1; MG/1
20 CAPSULE, EXTENDED RELEASE ORAL EVERY MORNING
Qty: 30 CAPSULE | Refills: 0 | Status: SHIPPED | OUTPATIENT
Start: 2024-09-26

## 2024-07-26 RX ORDER — DEXTROAMPHETAMINE SACCHARATE, AMPHETAMINE ASPARTATE MONOHYDRATE, DEXTROAMPHETAMINE SULFATE AND AMPHETAMINE SULFATE 5; 5; 5; 5 MG/1; MG/1; MG/1; MG/1
20 CAPSULE, EXTENDED RELEASE ORAL EVERY MORNING
Qty: 30 CAPSULE | Refills: 0 | Status: SHIPPED | OUTPATIENT
Start: 2024-08-26

## 2024-07-26 NOTE — TELEPHONE ENCOUNTER
Molly called stating that patient's labs will  prior to the date he will be going to complete them. She is requesting to replace orders. Orders updated

## 2024-07-26 NOTE — PROGRESS NOTES
Subjective:      Patient ID: Rashid Severino Jr. is a 44 y.o. Black or  male      Chief Complaint: Follow-up (ADHD)     This is a telemedicine note. Patient was treated using telemedicine, real-time audio and video. I, distant provider, conducted the visit from location identified below. The patient participated in the visit at a non- [facility] location selected by the patient identified below. I am licensed in the state where the patient stated they are located. The patient stated that they understood and accepted the privacy and security risks to their information at their location.  Patient was located in vehicle.    I, distant provider, was located at [facility]      Past Medical History:   Diagnosis Date    ADHD (attention deficit hyperactivity disorder)         HPI  Presents to clinic for 3-month follow up ADHD.     ADHD  -Current Meds: Adderall XR 20 mg po daily with improved symptoms  -Meds working well. Needs refills.               Patient Care Team:  Tony Orozco MD as PCP - General (Internal Medicine)      Review of Systems   Constitutional:  Negative for chills, fatigue, fever and unexpected weight change.   HENT: Negative.     Eyes: Negative.    Respiratory: Negative.  Negative for shortness of breath.    Cardiovascular: Negative.  Negative for chest pain.   Gastrointestinal: Negative.    Endocrine: Negative.    Genitourinary: Negative.    Musculoskeletal: Negative.    Integumentary:  Negative.   Allergic/Immunologic: Negative.    Neurological: Negative.  Negative for weakness.   Hematological: Negative.    Psychiatric/Behavioral: Negative.     All other systems reviewed and are negative.          Objective:      There were no vitals filed for this visit.   There is no height or weight on file to calculate BMI.     Physical Exam  Constitutional:       Appearance: Normal appearance.   Pulmonary:      Effort: No respiratory distress.   Neurological:      General: No  focal deficit present.      Mental Status: He is oriented to person, place, and time.            Current Outpatient Medications:     [START ON 9/26/2024] dextroamphetamine-amphetamine (ADDERALL XR) 20 MG 24 hr capsule, Take 1 capsule (20 mg total) by mouth every morning., Disp: 30 capsule, Rfl: 0    [START ON 8/26/2024] dextroamphetamine-amphetamine (ADDERALL XR) 20 MG 24 hr capsule, Take 1 capsule (20 mg total) by mouth every morning., Disp: 30 capsule, Rfl: 0    dextroamphetamine-amphetamine (ADDERALL XR) 20 MG 24 hr capsule, Take 1 capsule (20 mg total) by mouth every morning., Disp: 30 capsule, Rfl: 0    diclofenac (VOLTAREN) 75 MG EC tablet, TAKE 1 TABLET BY MOUTH TWICE A DAY, Disp: 60 tablet, Rfl: 0    Assessment & Plan:     Problem List Items Addressed This Visit          Psychiatric    Attention deficit hyperactivity disorder (ADHD) - Primary     Stable  Continue Adderall as prescribed;  reviewed; Rx sent  Instructed to continue to monitor symptoms  Report to ED for any CP, SOB, and/or worsening symptoms  Keep appt with PCP for follow up  Pt is agreeable to plan and verbalizes understanding         Relevant Medications    dextroamphetamine-amphetamine (ADDERALL XR) 20 MG 24 hr capsule (Start on 9/26/2024)    dextroamphetamine-amphetamine (ADDERALL XR) 20 MG 24 hr capsule (Start on 8/26/2024)    dextroamphetamine-amphetamine (ADDERALL XR) 20 MG 24 hr capsule            Total time:  5 minutes